# Patient Record
Sex: FEMALE | Race: ASIAN | NOT HISPANIC OR LATINO | URBAN - METROPOLITAN AREA
[De-identification: names, ages, dates, MRNs, and addresses within clinical notes are randomized per-mention and may not be internally consistent; named-entity substitution may affect disease eponyms.]

---

## 2020-01-10 ENCOUNTER — EMERGENCY (EMERGENCY)
Facility: HOSPITAL | Age: 20
LOS: 1 days | Discharge: HOME | End: 2020-01-10
Admitting: EMERGENCY MEDICINE
Payer: MEDICAID

## 2020-01-10 VITALS
RESPIRATION RATE: 18 BRPM | WEIGHT: 130.07 LBS | TEMPERATURE: 98 F | OXYGEN SATURATION: 97 % | HEART RATE: 55 BPM | DIASTOLIC BLOOD PRESSURE: 64 MMHG | SYSTOLIC BLOOD PRESSURE: 94 MMHG | HEIGHT: 69 IN

## 2020-01-10 PROCEDURE — 99053 MED SERV 10PM-8AM 24 HR FAC: CPT

## 2020-01-10 PROCEDURE — 99284 EMERGENCY DEPT VISIT MOD MDM: CPT

## 2020-01-10 RX ORDER — ONDANSETRON 8 MG/1
4 TABLET, FILM COATED ORAL ONCE
Refills: 0 | Status: COMPLETED | OUTPATIENT
Start: 2020-01-10 | End: 2020-01-10

## 2020-01-10 RX ADMIN — ONDANSETRON 4 MILLIGRAM(S): 8 TABLET, FILM COATED ORAL at 04:58

## 2020-01-10 NOTE — ED ADULT NURSE NOTE - CHPI ED NUR SYMPTOMS NEG
no abdominal distension/no fever/no chills/no disorientation/no pain/no confusion/no weakness/no abdominal pain

## 2020-01-10 NOTE — ED ADULT NURSE NOTE - OBJECTIVE STATEMENT
22 y/o F BIBA for alcohol intoxication. Pt vomited en route and in triage. Pt admits to drinking Tequila tonight. Pt denies falling down/hitting head and drug use. Pt has abrasions on B/L knees but no visible s/s of head trauma. Pupils PEARRL but sluggish. Pt unable to provide PMH.

## 2020-01-10 NOTE — ED PROVIDER NOTE - NSFOLLOWUPINSTRUCTIONS_ED_ALL_ED_FT
Alcohol intoxication occurs when the amount of alcohol that a person has consumed impairs his or her ability to mentally and physically function. Chronic alcohol consumption can also lead to a variety of health issues including neurological disease, stomach disease, heart disease, liver disease, etc. Do not drive after drinking alcohol.     SEEK IMMEDIATE MEDICAL CARE IF YOU HAVE ANY OF THE FOLLOWING SYMPTOMS: seizures, vomiting blood, blood in your stool, lightheadedness/dizziness, or becoming shaky to tremulous when you stop drinking.

## 2020-01-10 NOTE — ED PROVIDER NOTE - PATIENT PORTAL LINK FT
You can access the FollowMyHealth Patient Portal offered by [facility] by registering at the following website: [email or web address]. By joining [facility]’s FollowMyHealth portal, you will also be able to view your health information using other applications (apps) compatible with our system.

## 2020-01-10 NOTE — ED PROVIDER NOTE - PROGRESS NOTE DETAILS
patient now awake, alert., coherent, a&ox4, clear speech, tolerating PO. has no medical complaints. denies SI/HI, patient wishes  to leave, d/c home with boyfriend.

## 2020-01-10 NOTE — ED PROVIDER NOTE - OBJECTIVE STATEMENT
18 yo female accompanied by boyfriend, Vivi Hutchins, for alcohol intoxication. BIBEMS vomited en route. she states she was celebrating her anniversary with boyfriend and drank too much alcohol. Pt wakes up to verbal stimuli and quickly falls back to sleep. No signs of trauma. has no medical complaints. limited history due to patient's state.

## 2020-01-10 NOTE — ED ADULT NURSE NOTE - TEMPLATE LIST FOR HEAD TO TOE ASSESSMENT
Substance Abuse Tissue Cultured Epidermal Autograft Text: The defect edges were debeveled with a #15 scalpel blade.  Given the location of the defect, shape of the defect and the proximity to free margins a tissue cultured epidermal autograft was deemed most appropriate.  The graft was then trimmed to fit the size of the defect.  The graft was then placed in the primary defect and oriented appropriately.

## 2020-01-10 NOTE — ED ADULT TRIAGE NOTE - CHIEF COMPLAINT QUOTE
BIBA accompanied by friend (name: Vivi Hutchins) for alcohol intoxication. Vomited en route. Friend states 'just alcohol, no chemicals'. Pt wakes up to verbal stimuli and quickly falls back to sleep. No signs of trauma.

## 2020-01-21 DIAGNOSIS — R41.82 ALTERED MENTAL STATUS, UNSPECIFIED: ICD-10-CM

## 2020-01-21 DIAGNOSIS — F10.129 ALCOHOL ABUSE WITH INTOXICATION, UNSPECIFIED: ICD-10-CM

## 2021-04-05 NOTE — ED ADULT NURSE NOTE - CAS ELECT INFOMATION PROVIDED
Ángel Jerry  76 y.o.  Chief Complaint   Patient presents with   • Itching     pt c/o generalized itching x months; pt seen here multiple times for same; pt worried abt blood sugar; FSBS 74 in triage   pt returns to triage, checked in; NAD  
DC instructions